# Patient Record
Sex: FEMALE | Race: AMERICAN INDIAN OR ALASKA NATIVE
[De-identification: names, ages, dates, MRNs, and addresses within clinical notes are randomized per-mention and may not be internally consistent; named-entity substitution may affect disease eponyms.]

---

## 2020-10-10 ENCOUNTER — HOSPITAL ENCOUNTER (EMERGENCY)
Dept: HOSPITAL 11 - JP.ED | Age: 55
Discharge: HOME | End: 2020-10-10
Payer: COMMERCIAL

## 2020-10-10 DIAGNOSIS — F17.210: ICD-10-CM

## 2020-10-10 DIAGNOSIS — R19.7: ICD-10-CM

## 2020-10-10 DIAGNOSIS — J44.9: ICD-10-CM

## 2020-10-10 DIAGNOSIS — Z20.828: ICD-10-CM

## 2020-10-10 DIAGNOSIS — R11.2: Primary | ICD-10-CM

## 2020-10-10 PROCEDURE — 36415 COLL VENOUS BLD VENIPUNCTURE: CPT

## 2020-10-10 PROCEDURE — 85027 COMPLETE CBC AUTOMATED: CPT

## 2020-10-10 PROCEDURE — 83615 LACTATE (LD) (LDH) ENZYME: CPT

## 2020-10-10 PROCEDURE — 99284 EMERGENCY DEPT VISIT MOD MDM: CPT

## 2020-10-10 PROCEDURE — U0002 COVID-19 LAB TEST NON-CDC: HCPCS

## 2020-10-10 PROCEDURE — 80048 BASIC METABOLIC PNL TOTAL CA: CPT

## 2020-10-10 PROCEDURE — 86140 C-REACTIVE PROTEIN: CPT

## 2020-10-10 PROCEDURE — 87635 SARS-COV-2 COVID-19 AMP PRB: CPT

## 2020-10-10 PROCEDURE — 96374 THER/PROPH/DIAG INJ IV PUSH: CPT

## 2020-10-10 PROCEDURE — 96361 HYDRATE IV INFUSION ADD-ON: CPT

## 2020-10-10 NOTE — EDM.PDOC
ED HPI GENERAL MEDICAL PROBLEM





- General


Chief Complaint: Gastrointestinal Problem


Stated Complaint: NO TASTE OF SMELL VERY ILL


Time Seen by Provider: 10/10/20 08:25


Source of Information: Reports: Patient, EMS.  Denies: Old Records


History Limitations: Reports: Other (no old records)





- History of Present Illness


INITIAL COMMENTS - FREE TEXT/NARRATIVE: 





56 yo female visiting from [place], [place] and staying locally at a hotel presents

via EMS with severe vomiting and diarrhea. No fever. No hematemesis. No melena 

or hematochezia. EMS gave Zofran 4 mg IV without control of her nausea. Her 

 is with her. Has some abdominal pain, but this only began after the 

vomiting. No known exposures.  not ill. 


Onset: Gradual


Onset Date: 10/09/20


Onset Time: 16:30


Duration: Hour(s):, Constant


Location: Reports: Abdomen, Generalized


Quality: Reports: Ache


Severity: Mild


Improves with: Reports: None


Worsens with: Reports: Other (vomiting)


Context: Reports: Other (See HPI)


Associated Symptoms: Reports: Malaise, Nausea/Vomiting.  Denies: Cough, 

Fever/Chills, Rash, Shortness of Breath


Treatments PTA: Reports: Other (see below) (Zofran IV per EMS)


  ** Left Middle Abdomen


Pain Score (Numeric/FACES): 6





- Related Data


                                    Allergies











Allergy/AdvReac Type Severity Reaction Status Date / Time


 


No Known Allergies Allergy   Verified 10/10/20 08:14











Home Meds: 


                                    Home Meds





Acetaminophen/Codeine [Tylenol with Codeine No.3 300MG/30MG] 1 tab PO Q4H 

10/10/20 [History]


Gabapentin [Neurontin] 900 mg PO TID 10/10/20 [History]


tiZANidine [Zanaflex] 4 mg PO BID 10/10/20 [History]











Past Medical History


Respiratory History: Reports: COPD


OB/GYN History: Reports: Pregnancy


Musculoskeletal History: Reports: Back Pain, Chronic





- Past Surgical History


Female  Surgical History: Reports: Tubal Ligation


Neurological Surgical History: Reports: Spinal Fusion





Social & Family History





- Tobacco Use


Smoking Status *Q: Light Tobacco Smoker


Years of Tobacco use: 36


Packs/Tins Daily: 0.5





- Caffeine Use


Caffeine Use: Reports: Coffee





- Alcohol Use


Days Per Week of Alcohol Use: 2


Number of Drinks Per Day: 3


Total Drinks Per Week: 6





- Recreational Drug Use


Recreational Drug Use: Yes


Recreational Drug Type: Reports: Marijuana/Hashish


Recreational Drug Use Frequency: Socially





ED ROS GENERAL





- Review of Systems


Review Of Systems: See Below


Constitutional: Reports: Malaise, Decreased Appetite.  Denies: Fever, Chills


HEENT: Reports: No Symptoms


Respiratory: Reports: No Symptoms


Cardiovascular: Reports: No Symptoms


Endocrine: Reports: No Symptoms


GI/Abdominal: Reports: Abdominal Pain (L sided and epigastric), Diarrhea, 

Nausea, Vomiting.  Denies: Black Stool, Bloody Stool, Constipation, Distension, 

Flatus, Hematemesis, Hematochezia, Melena


: Reports: No Symptoms


Musculoskeletal: Reports: No Symptoms


Skin: Reports: No Symptoms


Neurological: Reports: No Symptoms


Psychiatric: Reports: No Symptoms





ED EXAM, GI/ABD





- Physical Exam


Exam: See Below


Exam Limited By: No Limitations


General Appearance: Alert, WD/WN, Mild Distress


Eyes: Bilateral: Normal Appearance


Ears: Normal External Exam, Normal Canal, Hearing Grossly Normal, Normal TMs


Nose: Normal Inspection, No Blood


Throat/Mouth: Normal Lips, Normal Oropharynx, Normal Voice, No Airway 

Compromise, Other (dry oral mucosa)


Head: Atraumatic, Normocephalic


Neck: Normal Inspection


Respiratory/Chest: No Respiratory Distress, Lungs Clear, Normal Breath Sounds, 

No Accessory Muscle Use


Cardiovascular: Regular Rate, Rhythm, No Edema.  No: Tachycardia


GI/Abdominal Exam: Soft, No Distention, Tender (mostly in epigastrium), Abnormal

 Bowel Sounds (slightly decreased).  No: Non-Tender, Distended, Guarding, Rigid,

 Rebound


Back Exam: Normal Inspection


Extremities: Normal Inspection, No Pedal Edema


Neurological: Alert, Oriented, CN II-XII Intact, Normal Cognition, No 

Motor/Sensory Deficits


Psychiatric: Normal Affect, Normal Mood


Skin Exam: Warm, Dry, Intact, Normal Color, No Rash





Course





- Vital Signs


Last Recorded V/S: 


                                Last Vital Signs











Temp  35.2 C L  10/10/20 08:19


 


Pulse  72   10/10/20 09:27


 


Resp  16   10/10/20 08:19


 


BP  177/81 H  10/10/20 09:27


 


Pulse Ox  97   10/10/20 09:27














- Orders/Labs/Meds


Orders: 


                               Active Orders 24 hr











 Category Date Time Status


 


 CORONAVIRUS COVID-19, [facility] Routine Lab  10/10/20 08:51 Ordered











Labs: 


                                Laboratory Tests











  10/10/20 10/10/20 Range/Units





  08:24 08:24 


 


WBC  16.0 H   (4.5-11.0)  K/uL


 


RBC  4.79   (3.30-5.50)  M/uL


 


Hgb  14.8   (12.0-15.0)  g/dL


 


Hct  47.2   (36.0-48.0)  %


 


MCV  99 H   (80-98)  fL


 


MCH  31   (27-31)  pg


 


MCHC  31 L   (32-36)  %


 


Plt Count  264   (150-400)  K/uL


 


Sodium   146  (140-148)  mmol/L


 


Potassium   3.6  (3.6-5.2)  mmol/L


 


Chloride   107  (100-108)  mmol/L


 


Carbon Dioxide   25  (21-32)  mmol/L


 


Anion Gap   14.4 H  (5.0-14.0)  mmol/L


 


BUN   13  (7-18)  mg/dL


 


Creatinine   0.9  (0.6-1.0)  mg/dL


 


Est Cr Clr Drug Dosing   57.17  mL/min


 


Estimated GFR (MDRD)   > 60  (>60)  


 


Glucose   140 H  ()  mg/dL


 


Calcium   9.1  (8.5-10.1)  mg/dL


 


Lactate Dehydrogenase   219  ()  U/L


 


C-Reactive Protein   0.94 H  (0.0-0.3)  mg/dL











Meds: 


Medications














Discontinued Medications














Generic Name Dose Route Start Last Admin





  Trade Name Freq  PRN Reason Stop Dose Admin


 


Acetaminophen  1,000 mg  10/10/20 08:44  10/10/20 09:14





  Tylenol Extra Strength  PO  10/10/20 08:45  1,000 mg





  ONETIME ONE   Administration


 


Acetaminophen  Confirm  10/10/20 09:17 





  Tylenol Extra Strength  Administered  10/10/20 09:18 





  Dose  





  500 mg  





  .ROUTE  





  .STK-MED ONE  


 


Sodium Chloride  1,000 mls @ 1,000 mls/hr  10/10/20 08:08  10/10/20 08:25





  Normal Saline  IV  10/10/20 09:07  1,000 mls/hr





  .BOLUS ONE   Administration


 


Loperamide HCl  4 mg  10/10/20 09:15  10/10/20 09:14





  Imodium  PO  10/10/20 09:16  4 mg





  ONETIME ONE   Administration


 


Ondansetron HCl  4 mg  10/10/20 08:08  10/10/20 08:10





  Zofran  IVPUSH  10/10/20 08:09  4 mg





  ONETIME ONE   Administration


 


Ondansetron HCl  Confirm  10/10/20 08:06  10/10/20 09:15





  Zofran  Administered  10/10/20 08:07  Not Given





  Dose  





  4 mg  





  .ROUTE  





  .STK-MED ONE  














Departure





- Departure


Time of Disposition: 10:00


Disposition: Home, Self-Care 01


Condition: Fair


Clinical Impression: 


 Nausea vomiting and diarrhea








- Discharge Information


*PRESCRIPTION DRUG MONITORING PROGRAM REVIEWED*: No


*COPY OF PRESCRIPTION DRUG MONITORING REPORT IN PATIENT PRANAV: No


Instructions:  Nausea and Vomiting, Adult, Easy-to-Read, Diarrhea, Adult, 

Easy-to-Read


Referrals: 


PCP,None [Primary Care Provider] - 


Forms:  ED Department Discharge


Additional Instructions: 


Take loperamide for diarrhea control per package instructions, this is over the 

counter. Use Zofran as directed for nausea control, an Rx was sent for you to 

[facility] pharmacy. Use acetaminophen for pain or fever control. Have a clear 

liquid diet and advance diet slowly as tolerated. 





Avoid exposure to others as you are certainly contagious. We did a Covid test 

today that will be available in 3-4 days per labs estimate(this is a send out). 





Recheck if you are worse. Get adequate rest and wash your hands often. 





Sepsis Event Note (ED)





- Evaluation


Sepsis Screening Result: No Definite Risk





- Focused Exam


Vital Signs: 


                                   Vital Signs











  Temp Pulse Resp BP Pulse Ox


 


 10/10/20 09:27   72   177/81 H  97


 


 10/10/20 08:19  35.2 C L  78  16  156/73 H  95














- My Orders


Last 24 Hours: 


My Active Orders





10/10/20 08:51


CORONAVIRUS COVID-19, [facility] Routine 














- Assessment/Plan


Last 24 Hours: 


My Active Orders





10/10/20 08:51


CORONAVIRUS COVID-19, [facility]INTON Routine